# Patient Record
Sex: FEMALE | Race: WHITE | NOT HISPANIC OR LATINO | Employment: FULL TIME | URBAN - METROPOLITAN AREA
[De-identification: names, ages, dates, MRNs, and addresses within clinical notes are randomized per-mention and may not be internally consistent; named-entity substitution may affect disease eponyms.]

---

## 2021-06-28 NOTE — TELEPHONE ENCOUNTER
REFERRAL INFORMATION:    Referring Provider:  N/A    Referring Clinic:  N/A    Reason for Visit/Diagnosis: New Re-establish care, Mohit 2001       FUTURE VISIT INFORMATION:    Appointment Date: 7/7/2021    Appointment Time: 1 PM      NOTES RECORD STATUS  DETAILS   OFFICE NOTE from Referring Provider N/A    OFFICE NOTE from Other Specialists Care Everywhere 6/29/17 Office visit with Dr. Mukund Aguilar ([facility])     4/27/16 Office visit with Cheryl Parker PA-C (General Surgery)      HOSPITAL DISCHARGE SUMMARY/ ED VISITS  N/A    OPERATIVE REPORT N/A    ENDOSCOPY (EGD)  N/A    PERTINENT LABS Care Everywhere    PATHOLOGY REPORTS (RELATED) N/A    IMAGING (CT, MRI, US, XR)  N/A

## 2021-07-06 ENCOUNTER — TELEPHONE (OUTPATIENT)
Dept: ENDOCRINOLOGY | Facility: CLINIC | Age: 41
End: 2021-07-06

## 2021-07-06 NOTE — TELEPHONE ENCOUNTER
Scheduled to see CNP or KELTON at [facility], followed by an appt with a dietitian at 1:00 pm.    Left message 10:01 am    Instructed to complete pre-visit questionnaires on Snapkin, or arrive 15 minutes early to complete.    [phone] contact center phone number.    MICHELLE APPIAH CMA       Suturegard Retention Suture: 2-0 Nylon

## 2021-07-07 ENCOUNTER — VIRTUAL VISIT (OUTPATIENT)
Dept: ENDOCRINOLOGY | Facility: CLINIC | Age: 41
End: 2021-07-07
Payer: COMMERCIAL

## 2021-07-07 ENCOUNTER — PRE VISIT (OUTPATIENT)
Dept: ENDOCRINOLOGY | Facility: CLINIC | Age: 41
End: 2021-07-07

## 2021-07-07 VITALS — HEIGHT: 67 IN | BODY MASS INDEX: 35.79 KG/M2 | WEIGHT: 228 LBS

## 2021-07-07 DIAGNOSIS — Z98.84 S/P BILIOPANCREATIC DIVERSION WITH DUODENAL SWITCH: Primary | ICD-10-CM

## 2021-07-07 DIAGNOSIS — E66.01 MORBID OBESITY (H): ICD-10-CM

## 2021-07-07 PROBLEM — E61.1 IRON DEFICIENCY: Status: ACTIVE | Noted: 2017-01-12

## 2021-07-07 PROCEDURE — 99204 OFFICE O/P NEW MOD 45 MIN: CPT | Mod: 95 | Performed by: NURSE PRACTITIONER

## 2021-07-07 RX ORDER — ERGOCALCIFEROL 1.25 MG/1
50000 CAPSULE ORAL
COMMUNITY
Start: 2020-11-10 | End: 2021-07-07

## 2021-07-07 RX ORDER — CYANOCOBALAMIN 1000 UG/ML
1000 INJECTION, SOLUTION INTRAMUSCULAR; SUBCUTANEOUS
COMMUNITY
Start: 2020-11-10 | End: 2021-07-07

## 2021-07-07 RX ORDER — TOPIRAMATE 25 MG/1
TABLET, FILM COATED ORAL
Qty: 90 TABLET | Refills: 1 | Status: SHIPPED | OUTPATIENT
Start: 2021-07-07 | End: 2021-08-31

## 2021-07-07 RX ORDER — CYANOCOBALAMIN 1000 UG/ML
1 INJECTION, SOLUTION INTRAMUSCULAR; SUBCUTANEOUS
Qty: 3 ML | Refills: 3 | Status: SHIPPED | OUTPATIENT
Start: 2021-07-07

## 2021-07-07 RX ORDER — PHENTERMINE HYDROCHLORIDE 37.5 MG/1
0.5 TABLET ORAL EVERY MORNING
Qty: 15 TABLET | Refills: 1 | Status: SHIPPED | OUTPATIENT
Start: 2021-07-07 | End: 2021-08-31

## 2021-07-07 ASSESSMENT — MIFFLIN-ST. JEOR: SCORE: 1731.83

## 2021-07-07 ASSESSMENT — PAIN SCALES - GENERAL: PAINLEVEL: NO PAIN (0)

## 2021-07-07 NOTE — PATIENT INSTRUCTIONS
"Thank you for allowing us the privilege of caring for you. We hope we provided you with the excellent service you deserve.   Please let us know if there is anything else we can do for you so that we can be sure you are completely satisfied with your care experience.    To ensure the quality of our services you may be receiving a patient satisfaction survey from an independent patient satisfaction monitoring company.    The greatest compliment you can give is a \"Likely to Recommend\"    Your visit was with Lori Harris NP today.    Instructions per today's visit:     Lauri Pearson, it was great to visit with you today.  Here is a review of our visit.  If our clinic scheduler is not able to reach you please call [phone] to schedule your next appointments.    MEDICATIONS:        - Start taking topiramate (taper up to 75mg), Phentermine 1/2 tab (18.75mg)      - Start Omeprazole, twice daily for ulcer         - B12 inj sent to pharmacy        - Continue other medications without change  CONSULTATION/REFERRAL to Dietitian   FUTURE LABS:       - Schedule a fasting blood draw -bariatric labs   FUTURE APPOINTMENTS:       - Follow-up visit in 2 months       Information about Video Visits with [employer]: video visit information  _________________________________________________________________________________________________________________________________________________________  Important contact and scheduling information:  Please call our contact center at [phone] to schedule your next appointments.  To find a lab location near you, please call [phone].  For any nursing questions or concerns call Ashley Alexander LPN at [phone] or Francisca Cristina RN at [phone]  Please call during clinic hours Monday through Friday 8:00a - 4:00p if you have questions or you can contact us via [employer] at anytime and we will reply during clinic hours.    Lab results will be communicated through My Chart or " letter (if My Chart not used). Please call the clinic if you have not received communication after 1 week or if you have any questions.?  Clinic Fax: [phone]  _________________________________________________________________________________________________________________________________________________________  Meal Replacement Products:    Here is the link to our new e-store where you can purchase our meal replacement products    [facility] E-Store  [email or web address]    The one week starter kit is a great way to sample a variety of products and see what works for you.    If you want more information about the product go to: [employer] Meals  "[employer]    If you are an employee or [facility] or [facility] please contact your care team for a 10% estore discount    Free Shipping for orders over $75     Benefits of meal replacements products:    Portion and calorie control  Improved nutrition  Structured eating  Simplified food choices  Avoid contact with trigger foods  _________________________________________________________________________________________________________________________________________________________  Interested in working with a health ?  Health coaches work with you to improve your overall health and wellbeing.  They look at the whole person, and may involve discussion of different areas of life, including, but not limited to the four pillars of health (sleep, exercise, nutrition, and stress management). Discuss with your care team if you would like to start working a health .  Health Coaching-3 Pack: Schedule by calling [phone]    $99 for three health coaching visits    Visits may be done in person or via phone    Coaching is a partnership between the  and the client; Coaches do not prescribe or diagnose    Coaching helps inspire the client to reach his/her personal goals    _________________________________________________________________________________________________________________________________________________________  24 Week Healthy Lifestyle Plan:    Our mission in the 24-week Healthy Lifestyle Plan is to provide you with individualized care by giving you the tools, education and support you need to lose weight and maintain a healthy lifestyle. In your 24-week journey, you ll be supported by a dedicated weight loss team that includes registered dietitians, medical weight management providers, health coaches, and nurses -- all with special expertise in weight loss -- to help you every step of the way.     Monthly meetings with your registered dietician or medical weight management provider help to review your progress, update your care plan, and make any adjustments needed to ensure success. Between these visits, weekly and bi-weekly health  visits will help you focus on the four pillars of weight loss -- stress, sleep, nutrition, and exercise -- and how you can best adapt each to achieve sustainable weight loss results.    In addition, you will be given exclusive access to online wellbeing classes through [employer].  Your initial visit will be with a medical weight management provider who will help to understand your weight loss goals and ensure this program is the right fit for you. Please let our team know if you are interested in the 24 week plan by sending a message to your care team or calling [phone] to schedule.  _________________________________________________________________________________________________________________________________________________________    Virtual Support Groups:    We offer support groups for patients who are working on weight loss and considering, preparing for or have had weight loss surgery.     You are invited to attend the?Virtual Support Groups?provided by any of the following locations:    1. [place] minutes via [employer]  with Stella Mace RN  2.   [place] via TrustedAd Teams with Jorge Vogel, PhD, LP  3.   [place] via [employer]ed[employer] Teams with Angeles Palafox RN  4.   [facility] via Trusted[employer] Teams with Angeles Verduzco [facility] Healthy Lifestyle Virtual Support Group    Healthy Lifestyle Virtual Support Group?    This group is held monthly on a Friday from 12:30 PM - to 1:30 PM. It is 60 minutes of small group guided discussion, support and resources led by National Board Certified Health , Angeles Verduzco. All are welcome who want a healthy lifestyle. To receive monthly invites to the Healthy Lifestyle Virtual Support Group or if you have any questions about having a health  or attending support group, please email Angeles [email or web address]. Angeles will send out invites for each session, with the phone number and the conference ID number specific to that session.    2021 Meetings    January 29 - How to Stay Active and Healthy during the Winter Months    February 26 - Reading Food Labels: What do I Need to Know?  Guest Speaker: Alicia Patterson RD    March 19 - Finding Health, Happiness and Confidence at Every Size  Guest Speaker: Kisha Patel, Health Psychology Fellow    April 30 - Healthy Eating on a Budget  Guest Speaker: Sushma Aguirre RD    May 21 - Open Forum    Jun 25 - Self Compassion    July 30 - Habits: Helpful and Hindering    August 27 - Open Forum    September 24 - Sleep    October 29 - Open Forum    November - To be determined    December - To be determined    [facility] Weight Loss Surgery Support Group    [facility] Weight Loss Surgery Support Group    The [facility] Weight Loss Surgery Support Group is a patient-lead forum that meets monthly. Due to Covid-19, we are meeting remotely from 5 PM - 6 PM via Microsoft Teams on the 3rd Wednesday of the month. The group is facilitated by Stella  "Rodri, the program s Clinical Coordinator. The support group shares experiences, encouragement and education. It is open to those who have had weight loss surgery, are scheduled for surgery, and those who are considering surgery.   If you are interested in attending, please contact the clinic via [employer] or call the nurse line directly at [phone] to inform our staff that you would like an invite sent to you. Please let us know the email you would like the invite sent to. Prior to the meeting, a link with directions on how to join the meeting will be sent to you.    2021 Meetings    January 20 - Guest Speaker: Benjamin James, RD, LD     February 17 - The group will not have a speaker. This will be a time of group support.     March 17 - Guest Speaker: Bailey Lee, [facility], [facility], [facility]     April 21 - Guest Speakers. Mohinder Ramos and Maria Victoria will be talking about Mindful Eating and Mind Hunger.     May 19- No Group this month.     June 16 - \"Let's Talk\" a group discussion and time to talk and ask questions and learn from one another.    [facility] Support Groups    Connections: Bariatric Care Support Group?  This group takes place the second Tuesday of each month from 6:30 PM - 8 PM virtually using Microsoft Teams. It is led by Jorge Vogel, Ph.D who is a Licensed Psychologist with the [facility] Weight Management Program. There is no cost for group participation and it is open to all [facility] (and those external to this program) pre- and post- operative bariatric surgery patients as well as their support system.   Please send an email to Jorge Vogel, Ph.D., LP at?[email or web address]?if you would like an invitation to the group and to learn about using Microsoft Teams.    Connections: Post-Operative Bariatric Surgery Support Group  This support group meets the 4th Wednesday of the month from 11 AM - 12 PM virtually using " Microsoft Teams. It is led by Angeles Palafox RN. This is a support group for [facility] bariatric patients (and those external to [facility]) who have had bariatric surgery and are at least 3 months post-surgery. There is no cost to attend and registration is not required. Please send an email to Angeles Palafox RN at [email or web address] if you would like an invitation to the group and to learn about using Microsoft Teams.      The above Support Group information can also be found on our websites:    [email or web address]  _________________________________________________________________________________________________________________________________________________________  [facility] Get Moving Program  Our team of physical therapists is trained to help you understand and take control of your condition. They will perform a thorough evaluation to determine your ability for activity and develop a customized plan to fit your goals and physical ability.  Scheduling: Unsure if the Get Moving program is right for you? Discuss the program with your medical provider or diabetes educator. You can also call us at [phone] to ask questions or schedule an appointment.   [facility] Get Moving Program  _________________________________________________________________________________________________________________________________________________________  [facility] Diabetes Prevention Program (DPP)  If you have prediabetes and Medicare please contact us via CloudSwayt to learn more about the Diabetes Prevention Program (DPP)  Program Details:  [facility] offers the year-long Diabetes Prevention Program (DPP). The program helps you to make lifestyle changes that prevent or delay type 2 diabetes by supporting healthy eating, increased physical activity, stress reduction and use of coping skills.    On average, previous [facility] DPP cohorts have lost and maintained at least 5% of their starting weight throughout the program and averaged more than 150 minutes of physical activity per week.  Participants meet weekly for one-hour group sessions over sixteen weeks, every other week for the next 8 weeks, and monthly for the last six months.   A year-long maintenance program is also available for participants who complete the first year.   Location & Cost:   During the COVID-19 Public Health Emergency, the program is offered virtually. When in-person classes can resume, they will be held at [facility].  For people with Medicare, the program is covered in full. A self-pay option will also be available for those with non-Medicare insurance plans.   _________________________________________________________________________________________________________________________________________________________  Bluetooth Scale:    We hope to provide you with high quality virtual healthcare visits while social distancing for COVID-19 is necessary, as well as in the future when virtual visits may be more convenient for you.     Our technology team made it possible for Bluetooth scales to send weight measurements to our electronic medical record. This allows weights from you weighing at home to securely flow into the medical record, which will improve telephone and virtual visits.   Additionally, studies have shown that adults actually lose more weight when their weights are automatically sent to someone else, and also that this process is not stressful for those adults.    Below is a link for purchasing the scale, with a discount code for our patients. You may call your insurance company to see if they will reimburse you for the cost of the scale, as a piece of durable medical equipment. The scales only go up to a weight of 400 pounds. This is an issue and we are working with the  developer on increasing this. We found no scales that go over 400lb that have blue-tooth for connecting to appAttach.    Scale to purchase: the NTQ-Data  Body  Scale: [email or web address]=aw.ds    Discount Code: We have a discount code for our patients to bring the cost down to $50, Discount code is: UMinchelita_Scale_20%off      Thank you,   [facility] Weight Management Team                             MEDICATION STARTED AT THIS APPOINTMENT    We are starting Phentermine. Take one tablet in the morning. Contact the nurse via appAttach or call [phone] if you have any questions or concerns. (Do not stop taking it if you don't think it's working. For some people it works without them knowing it.)    Phentermine is being prescribed because you identified hunger as one of the main causes for your extra weight.      Our patients on Phentermine find that they:    >feel less hunger    >find it easier to push the plate away   >have an easier time eating less    For some of our patients, these feelings are very real and immediate. For other patients, the feelings are less obvious. They don't feel much of a change but find they've lost weight. Like all weight loss medications, Phentermine  works best when you help it work. This means:  1. Having less tempting high calorie (fattening) food around the house or office. (For people with strong cravings this is very important.)   2. Staying away from situations or people that may trigger your cravings .   3. Eating out only one time or less each week.  4. Eating your meals at a table with the TV or computer off.    Side-effects. Phentermine is generally well tolerated. The main side-effects we see are feelings of racing pulse or rapid heart beat. Some people can get an elevated blood pressure. Because of this we may have you come back within a week or so of starting the  medication for a blood pressure check.         In order to get refills of this or any medication we prescribe you must be seen in the medical weight mgmt clinic every 2-3 months.      MEDICATION STARTED AT THIS APPOINTMENT  We are starting topiramate at bedtime.  Start one tab, 25 mg, for a week. Go up to 50 mg (2 tabs) for the next week. At the third week, take   3 tabs (75 mg).  Stay at 3 tabs until you are seen again. Contact the nurse via Share Some Style or call [phone] if you have any questions or concerns. (Do not stop taking it if you don't think it's working. For some people it works even though they do not feel much different.)    Topiramate (Topamax) is a medication that is used most often to treat migraine headaches or for seizures. It has also been found to help with weight loss. Although it's not currently FDA approved for weight loss, it has been used safely for a number of years to help people who are carrying extra weight.     Just how topiramate helps with weight loss has not been exactly determined. However it seems to work on areas of the brain to quiet down signals related to eating.      Topiramate may make you:    >feel less interest in eating in between meals   >think less about food and eating   >find it easier to push the plate away   >find giving up pop easier    >have an easier time eating less    For some of our patients, the pills work right away. They feel and think quite differently about food. Other patients don't feel much of a change but find in fact they have lost weight! Like all weight loss medications, topiramate works best when you help it work.  This means:    1) Have less tempting high calorie (fattening) food around the house or office    2) Have lower calorie food (fruits, vegetables,low fat meats and dairy) for snacks    3) Eat out only one time or less each week.   4) Eat your meals at a table with the TV or computer off.    Side-effects. Topiramate is generally well  tolerated. The main side-effects we see are:   Tingling in hands,feet, or face (usually not very troublesome)   Mental confusion and word finding trouble (about 10% of patients have this.)     Feeling sleepy or a bit dopey- this goes away very soon after starting.    One of the dangers of topiramate is the possibility of birth defects--if you get pregnant when you are on it, there is the risk that your baby will be born with a cleft lip or palate.  If you are on topiramate and of child bearing age, you need to be on a reliable form of birth control or refrain from sexual intercourse.     Please refer to the pharmacy insert for more information on side-effects. Since many pharmacists are not familiar with the use of topiramate in weight loss, calling the clinic will get you the most accurate information on the use of this medication for weight loss.     In order to get refills of this or any medication we prescribe you must be seen in the medical weight mgmt clinic every 2-3 months.

## 2021-07-07 NOTE — PROGRESS NOTES
Saima is a 41 year old who is being evaluated via a billable video visit.      How would you like to obtain your AVS? Instabeathart  If the video visit is dropped, the invitation should be resent by: Text to cell phone: [phone]  Will anyone else be joining your video visit? No      Video Start Time: 1258  Video-Visit Details    Type of service:  Video Visit    Video End Time:1319    Originating Location (pt. Location): Home    Distant Location (provider location):  [facility] WEIGHT MANAGEMENT CLINIC [place]     Platform used for Video Visit: WangYou

## 2021-07-07 NOTE — NURSING NOTE
"Chief Complaint   Patient presents with     Consult     Consultation Bariatric Surgery Re-Establish Care       Vitals:    07/07/21 1223   Weight: 103.4 kg (228 lb)   Height: 1.702 m (5' 7\")       Body mass index is 35.71 kg/m .                         "

## 2021-07-07 NOTE — ASSESSMENT & PLAN NOTE
18 years s/p DS with difficulties with weight regain and epigastric pain. Lap sandra 2018. Pain is worse in the morning, will increase in severity that she doubles over in pain. Pain resolves with oral intake. No recent EGD. No PPI. No tobacco use since quitting in 2018. No NSAIDS. Rare alcohol. No signs of bleeding. Recommend omeprazole twice daily on empty stomach and consider EGD in the future.     No recent labs. Feels sluggish. paraesthesias previously but not recently. Noted easy bruising in Care Everywhere 11/2020. Does not take any supplements currently. DS labs ordered. Patient requests b12 script, sent to pharmacy.

## 2021-07-07 NOTE — LETTER
"2021       RE: Saima Pearson  [id number]  [place] [address]     Dear Colleague,    Thank you for referring your patient, Saima Pearson, to the [facility] [place] at [facility]. Please see a copy of my visit note below.    New Re-establish Bariatric Surgery Consultation Note    2021    RE: Saima Pearson  MR#: [id number]  : 1980      Referring provider: No flowsheet data found.    Chief Complaint/Reason for visit: re-establish bariatric care    Dear Marilyn Monte (General),    I had the pleasure of seeing your patient, Saima Pearson, to re-establish bariatric care. As you know, Saima Pearson is 41 year old.  She has a height of 5' 7\", a weight of 228 lbs 0 oz, and calculated Body mass index is 35.71 kg/m .. She has a history of DS in   at [facility] with Dr. Rueda.     Assessment & Plan   Problem List Items Addressed This Visit        Digestive    Morbid obesity (H)     Continued struggles with weight s/p DS. Has been working on going back to post bariatric surgery diet but struggles with cravings and thoughts about food and urges. Difficult to stop eating when full if food tastes good. She is wondering if she could consider a revision to get restiction back. However, it sounds like adequate restriction still exists. Discussed that revision for weight loss is not offered at our clinic- risk very high and limited results from the procedure. mwm would be recommended.     For cravings/ thoughts about food, discussed topiramate and phentermine. [employer]care would not cover Wegovy. No hx of DMII so other GLP1 not likely covered. Qsymia not likely covered. No hx kidney stones or glaucoma. No hx of HTN or anxiety. Will start phentermine and topiramate and follow up in 2 months. Stressed hydration.     Stressed importance of RD as well.          Relevant Medications    topiramate " "(TOPAMAX) 25 MG tablet    phentermine (ADIPEX-P) 37.5 MG tablet       Other    S/P biliopancreatic diversion with duodenal switch - Primary     18 years s/p DS with difficulties with weight regain and epigastric pain. Lap sandra 2018. Pain is worse in the morning, will increase in severity that she doubles over in pain. Pain resolves with oral intake. No recent EGD. No PPI. No tobacco use since quitting in 2018. No NSAIDS. Rare alcohol. No signs of bleeding. Recommend omeprazole twice daily on empty stomach and consider EGD in the future.     No recent labs. Feels sluggish. paraesthesias previously but not recently. Noted easy bruising in Care Everywhere 11/2020. Does not take any supplements currently. DS labs ordered. Patient requests b12 script, sent to pharmacy.              Relevant Medications    omeprazole (PRILOSEC) 20 MG DR capsule    Syringe/Needle, Disp, (BD ECLIPSE SYRINGE) 25G X 5/8\" 3 ML MISC    cyanocobalamin (CYANOCOBALAMIN) 1000 MCG/ML injection    Other Relevant Orders    CBC with platelets    Comprehensive metabolic panel    Ferritin    Hemoglobin A1c    Lipid panel reflex to direct LDL Fasting    Parathyroid Hormone Intact    Vitamin A    Vitamin B12    Vitamin D Deficiency    Vitamin K    Vitamin E         Care everywhere [facility], primary care, labs, images   Unable to access all records from [facility]   Review of external notes as documented above     45 minutes spent on the date of the encounter doing chart review, history and exam, documentation and further activities per the note    Saima Pearson is a 41 year old female who presents to clinic today for the following health issues       HISTORY OF PRESENT ILLNESS:  Weight Loss History Reviewed with Patient 7/7/2021   How long have you been overweight? Since early childhood   What is the most that you have ever weighed? 315   What is the most weight you have lost? 90   I have tried the following methods to lose weight Watching " portions or calories, Exercise, Weight Watchers, Weight Loss Surgery, Physician directed program   I have tried the following weight loss medications? (Check all that apply) Phentermine/Adipex-p/Suprenza   Have you ever had weight loss surgery? Yes   Please select the type of weight loss surgery you had (select all that apply): duodenal switch   Previous NRB 2016 with Cheryl Parker PA-C - weight regain and RUQ pain     Struggling with weight loss- working on stopping when full. Continues to have restriction- sometimes difficult to stop because it tastes good    Hunger between meals while decreasing portions at meals.     Continued epigastric pain- worse right away in the morning. Will bend over in pain. Improves with eating. This is intermittent. Patient suspects ulcer- no recent EGD, not taking anything for this.     Overall, feels sluggish and always tired. No taking any supplements. Ran out of b12. No recent labs.     Bright red blood per rectum 11/2020- has resolved. No black tary stools. No vomiting.     No NSAIDS, no tobacco     CO-MORBIDITIES OF OBESITY INCLUDE:     7/7/2021   I have the following health issues associated with obesity: Pre-Diabetes, Sleep Apnea, GERD (Reflux)   I have the following symptoms associated with obesity: -     Family Medicine 11/2020- periumbilical pain, rectal bleeding, easy bruising   1/2019- parasthesias?   No EGD?     PAST MEDICAL HISTORY:  No past medical history on file.  No hx of kidney stones     PAST SURGICAL HISTORY:  No past surgical history on file.  S/p sandra    FAMILY HISTORY:   No family history on file.    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 -   Which best describes your marital status:    Do you have children? Yes   Who do you have in your support network that can be  available to help you for the first 2 weeks after surgery? My boyfriend and mother   Who can you count on for support throughout your weight loss surgery journey? boyfriend   Can you afford 3 meals a day?  Yes   Can you afford 50-60 dollars a month for vitamins? Yes       HABITS:     7/7/2021   How often do you drink alcohol? Monthly or less   If you do drink alcohol, how many drinks might you have in a day? (one drink = 5 oz. wine, 1 can/bottle of beer, 1 shot liquor) 3 or 4   Have you ever used any of the following nicotine products? Cigarettes   If you previously used any of these products, what year did you quit? 2018   Have you or are you currently using street drugs or prescription strength medication for which you do not have a prescription for? No   Do you have a history of chemical dependency (alcohol or drug abuse)? No     Tobacco use- 2018    PSYCHOLOGICAL HISTORY:   Psychological History Reviewed With Patient 7/7/2021   Have you ever attempted suicide? Never.   Have you had thoughts of suicide in the past year? No   Have you ever been hospitalized for mental illness or a suicide attempt? Never.   Do you have a history of chronic pain? No   Have you ever been diagnosed with fibromyalgia? No   Are you currently being treated for any of the following? (select all that apply) -   Are you currently seeing a therapist or counselor?  No   Are you currently seeing a psychiatrist? No       ROS:     7/7/2021   Skin:  Varicose veins   HEENT: None of these   Musculoskeletal: Joint Pain, Back pain   Cardiovascular: None of the above   Pulmonary: Shortness of breath with activity   Gastrointestinal: Heartburn, Reflux, Ulcers   Genitourinary: None of the above   Hematological: None of the above   Neurological: None of the above   Female only: None of the above       EATING BEHAVIORS:     7/7/2021   Have you or anyone else thought that you had an eating disorder? No   Do you currently binge eat (eat a large amount of  "food in a short time)? No   Are you an emotional eater? No   Do you get up to eat after falling asleep? No     Meets protein goal   Significant cravings, difficult to say no to things       EXERCISE:     7/7/2021   How often do you exercise? 3 to 4 times per week   What is the duration of your exercise (in minutes)? 20 Minutes   What types of exercise do you do? walking, other   What keeps you from being more active?  I am as active as I can possbily be, Too tired       MEDICATIONS:  Current Outpatient Medications   Medication Sig Dispense Refill     cyanocobalamin (CYANOCOBALAMIN) 1000 MCG/ML injection Inject 1 mL (1,000 mcg) Subcutaneous every 30 days 3 mL 3     omeprazole (PRILOSEC) 20 MG DR capsule Take 1 capsule (20 mg) by mouth 2 times daily 60 capsule 3     phentermine (ADIPEX-P) 37.5 MG tablet Take 0.5 tablets (18.75 mg) by mouth every morning 15 tablet 1     Syringe/Needle, Disp, (BD ECLIPSE SYRINGE) 25G X 5/8\" 3 ML MISC 1 Syringe every 30 days 3 each 3     topiramate (TOPAMAX) 25 MG tablet 25mg at bedtime for week 1, 50mg at bedtime for 1 week, and 75mg at bedtime thereafter 90 tablet 1       ALLERGIES:  No Known Allergies  Bariatric labs 11/2020 [facility] Care Everywhere- Vitamin D and CBC, Ferritin and CMP WNL ; B12 low - other labs not done.     2017- normal UGI - care everywhere   2017- abd ultrasound- multiple gallstones       Historic Results on 07/25/2007   Component Date Value Ref Range Status     Transferrin-Binding Capacity 07/25/2007 487  315 - 540 ug/dL Final     Transferrin 07/25/2007 327  210 - 360 mg/dL Final     Iron 07/25/2007 33* 35 - 180 ug/dL Final     Platelet Count 07/25/2007 324  150 - 450 10e9/L Final     Zinc 07/25/2007 55  55 - 150 ug/dL Final     Vitamin K 07/25/2007 < 0.03   Final    Comment: Reference range: 0.10 to 2.20                           Unit: ng/mL                           (Note)                           Vitamin K concentrations in healthy individuals              "              typically are greater than 0.10 ng/mL.  Low plasma                           vitamin K concentrations reflect low hepatic stores.                           Repeated specimen freezing and thawing, and exposure                           to ultraviolet light may result in decreased values.                           Performed by [facility],                           [address] [place] [address] [phone]                           [email or web address],  Peter Contreras MD - Lab. Director     25 OH Vit D2 07/25/2007 <5  ug/L Final     25 OH Vit D3 07/25/2007 37  ug/L Final     25 OH Vit D total 07/25/2007 <42  30 - 75 ug/L Final    Comment: Season, race, dietary intake, and treatment affect the concentration of                            25-hydroxy-Vitamin D. Values may decrease during winter months and increase                            during summer months. Values less than 30 ug/L may indicate Vitamin D                            deficiency.     Vitamin B6 07/25/2007 35.7*  Final    Comment: Reference range: 5.0 to 30.0                           Unit: ng/mL                           (Note)                           Performed by [facility],                           [address] [facility] [place] [address] [phone]                           [email or web address],  Peter Contreras MD - Lab. Director     Vitamin B2 07/25/2007 SEE NOTE   Final    Comment: (Note)                           TEST DESCRIPTION         RESULT       REFERENCE RANGE                           ----------------         ------       ---------------                           VIT. B2, RIBOFLAVIN*     5.4          3 - 15 ng/mL                           *This test was evaluated and its performance                           characteristics determined by [facility].                           It has not been cleared or approved by the U.S. Food and                           Drug Administration.  The FDA has determined that  such a                           clearance or approval is not necessary.  The results                           reported for this procedure are for research use only.                           [facility] is certified under the Clinical                           Laboratory Improvement Act of 1988 (CLIA) as qualified                           to perform high-complexity clinical testing.                           TEST PERFORMED BY:                               [facility]                               [address] [place]  [address]                           Performed by [facility],                           [address] [place] [address] [phone]                           [email or web address],  Danna Contreras MD - Lab. Director     Vitamin B1 07/25/2007 1.1   Final    Comment: Reference range: 0.2 to 2.0                           Unit: ug/dL                           (Note)                           Performed by [facility],                           [address] [place] [address] [phone]                           [email or web address],  Danna Contreras MD - Lab. Director     Vitamin E 07/25/2007 5.4*  Final    Comment: Reference range: 5.5 to 18.0                           Unit: mg/L     Vitamin E Gamma 07/25/2007 0.7   Final    Comment: Reference range: 0.0 to 6.0                           Unit: mg/L                           (Note)                           Performed by [facility],                           [address] [place] [address] [phone]                           [email or web address],  Danna Contreras MD - Lab. Director     Vitamin A 07/25/2007 0.26*  Final    Comment: Reference range: 0.30 to 1.20                           Unit: mg/L     Retinol Palmitate 07/25/2007 0.00   Final    Comment: Reference range: 0.00 to 0.10                           Unit: mg/L     Vitamin A Interp 07/25/2007 SEE NOTE   Final     Comment: (Note)                           Retinol greater than 0.20 mg/L is typically associated                           with adequate liver stores in adults, and is within                           normal limits for children. Retinol less than 0.10 mg/L                           may indicate depleted liver stores and severe deficiency.                           Specimens which come in contact with plastic tubing or                           are exposed to excessive light may show low results.                           Performed by [facility],                           [facility] [place] [address] [phone]                           [email or web address],  Peter Contreras MD - Lab. Director     Vitamin B12 07/25/2007 224  >210 pg/mL Final    Interp: 211-246 = Indeterminate     TSH 07/25/2007 2.17  0.4 - 5.0 mU/L Final     Parathyroid Hormone Intact 07/25/2007 48  12 - 72 pg/mL Final     Phosphorus 07/25/2007 4.7* 2.5 - 4.5 mg/dL Final     Magnesium 07/25/2007 1.7  1.6 - 2.3 mg/dL Final     MCV 07/25/2007 84  78 - 100 fl Final     MCH 07/25/2007 27.4  26.5 - 33.0 pg Final     MCHC 07/25/2007 32.8  31.5 - 36.5 g/dL Final     RDW 07/25/2007 15.9* 10.0 - 15.0 % Final     WBC 07/25/2007 9.3  4.0 - 11.0 10e9/L Final     RBC Count 07/25/2007 4.27  3.8 - 5.2 10e12/L Final     Hemoglobin 07/25/2007 11.7  11.7 - 15.7 g/dL Final     Hematocrit 07/25/2007 35.7  35.0 - 47.0 % Final     Homocysteine umol/L 07/25/2007 9.6  4.0 - 12.0 umol/L Final    Comment: A normal plasma homocysteine level rules out homocystinuria homozygote and                            defects involving the remethylation of homocysteine.  The heterozygous state                            for cystathionine betasynthase (CBS) is best tested by methionine loading.                            Lv Oconnor, Ph.D. [phone]     Folate 07/25/2007 11.4  >3.3 ng/mL Final    Interp:  >5.4 ng/mL = Normal     Ferritin 07/25/2007 5* 10 - 120 ng/mL Final      Copper 07/25/2007 83   Final    Comment: Reference range: 80 to 155                           Unit: ug/dL                           (Note)                           TEST INFORMATION: Copper, Serum                           Serum copper may be elevated with infection, inflammation,                           stress, copper supplementation, oral contraceptives, and                           pregnancy. Concentrations are 2-3 times normalin the                           third trimester of pregnancy. Copper may be lowered with                           corticosteroids, zinc, malnutrition and malabsorption.                           Performed by [facility],                           [address] [place] [address] [phone]                           [email or web address],  Peter Contreras MD - Lab. Director     Sodium 07/25/2007 142  133 - 144 mmol/L Final     Potassium 07/25/2007 4.4  3.4 - 5.3 mmol/L Final     Chloride 07/25/2007 105  94 - 109 mmol/L Final     Carbon Dioxide 07/25/2007 24  20 - 32 mmol/L Final     Glucose 07/25/2007 76  60 - 99 mg/dL Final     Urea Nitrogen 07/25/2007 13  5 - 24 mg/dL Final     Creatinine 07/25/2007 0.71  0.60 - 1.30 mg/dL Final     GFR Estimate 07/25/2007 >90  >60 mL/min/1.7m2 Final     GFR Estimate If Black 07/25/2007 >90  >60 mL/min/1.7m2 Final     Calcium 07/25/2007 8.7  8.5 - 10.4 mg/dL Final     AST 07/25/2007 25  0 - 45 U/L Final     Protein Total 07/25/2007 7.5  6.0 - 8.2 g/dL Final     Anion Gap 07/25/2007 13  6 - 17 mmol/L Final     Albumin 07/25/2007 4.5  3.3 - 4.6 g/dL Final     ALT 07/25/2007 17  0 - 50 U/L Final     Alkaline Phosphatase 07/25/2007 59  40 - 150 U/L Final     Bilirubin Total 07/25/2007 0.3  0.2 - 1.3 mg/dL Final     Carnitine Free 07/25/2007 45   Final    Comment: Reference range: 25 to 60                           Unit: umol/L     Carnitine Total 07/25/2007 55   Final    Comment: Reference range: 34 to 86                           Unit: umol/L      "Carnitine Esterified 07/25/2007 10   Final    Comment: Reference range: 5 to 29                           Unit: umol/L     Cholesterol 07/25/2007 122  0 - 200 mg/dL Final    Comment: LDL Cholesterol is the primary guide to therapy: LDL-cholesterol goal in high                            risk patients is <100 mg/dL and in very high risk patients is <70 mg/dL.                            The NCEP recommends further evaluation of: patients with cholesterol <200                            mg/dL                            if additionalrisk factors are present, cholesterol >240 mg/dL, triglycerides                            >150 mg/dL, or HDL <40 mg/dL.     Triglycerides 07/25/2007 98  0 - 150 mg/dL Final     HDL Cholesterol 07/25/2007 48* 50 - 110 mg/dL Final     LDL Cholesterol Calculated 07/25/2007 55  0 - 129 mg/dL Final     VLDL-Cholesterol 07/25/2007 20  0 - 30 mg/dL Final     Cholesterol/HDL Ratio 07/25/2007 2.6  0.0 - 5.0 Final     Iron Saturation 07/25/2007 7* 20 - 55 % Final       PHYSICAL EXAM:  Objective    Ht 1.702 m (5' 7\")   Wt 103.4 kg (228 lb)   BMI 35.71 kg/m    Vitals - Patient Reported  Pain Score: No Pain (0)      Vitals:  No vitals were obtained today due to virtual visit.    Physical Exam   GENERAL: Healthy, alert and no distress  EYES: Eyes grossly normal to inspection.  No discharge or erythema, or obvious scleral/conjunctival abnormalities.  RESP: No audible wheeze, cough, or visible cyanosis.  No visible retractions or increased work of breathing.    SKIN: Visible skin clear. No significant rash, abnormal pigmentation or lesions.  NEURO: Cranial nerves grossly intact.  Mentation and speech appropriate for age.  PSYCH: Mentation appears normal, affect normal/bright, judgement and insight intact, normal speech and appearance well-groomed.      In summary, Saima Pearson has Class II obesity with a body mass index of Body mass index is 35.71 kg/m . kg/m2 and the comorbidities stated above. " She has a history of D/S in 2003 and is here to re-establish bariatric care and discuss weight regain and epigastric pain.    MEDICATIONS:        - Start taking topiramate (taper up to 75mg), Phentermine 1/2 tab (18.75mg)      - Start Omeprazole, twice daily for ulcer         - B12 inj sent to pharmacy        - Continue other medications without change  CONSULTATION/REFERRAL to Dietitian   FUTURE LABS:       - Schedule a fasting blood draw -bariatric labs   FUTURE APPOINTMENTS:       - Follow-up visit in 2 months         Sincerely,     Lori Harris NP       [facility]     Saima is a 41 year old who is being evaluated via a billable video visit.      How would you like to obtain your AVS? MyChart  If the video visit is dropped, the invitation should be resent by: Text to cell phone: [phone]  Will anyone else be joining your video visit? No      Video Start Time: 1258  Video-Visit Details    Type of service:  Video Visit    Video End Time:1319    Originating Location (pt. Location): Home    Distant Location (provider location):  [facility]NAG[facility] [employer]     Platform used for Video Visit: [employer]

## 2021-07-07 NOTE — ASSESSMENT & PLAN NOTE
Continued struggles with weight s/p DS. Has been working on going back to post bariatric surgery diet but struggles with cravings and thoughts about food and urges. Difficult to stop eating when full if food tastes good. She is wondering if she could consider a revision to get restiction back. However, it sounds like adequate restriction still exists. Discussed that revision for weight loss is not offered at our clinic- risk very high and limited results from the procedure. mwm would be recommended.     For cravings/ thoughts about food, discussed topiramate and phentermine. [employer] would not cover Wegovy. No hx of DMII so other GLP1 not likely covered. Qsymia not likely covered. No hx kidney stones or glaucoma. No hx of HTN or anxiety. Will start phentermine and topiramate and follow up in 2 months. Stressed hydration.     Stressed importance of RD as well.

## 2021-07-07 NOTE — PROGRESS NOTES
"New Re-establish Bariatric Surgery Consultation Note    2021    RE: Saima Pearson  MR#: [id number]  : 1980      Referring provider: No flowsheet data found.    Chief Complaint/Reason for visit: re-establish bariatric care    Dear Marilyn Monte (General),    I had the pleasure of seeing your patient, Saima Pearson, to re-establish bariatric care. As you know, Saima Pearson is 41 year old.  She has a height of 5' 7\", a weight of 228 lbs 0 oz, and calculated Body mass index is 35.71 kg/m .. She has a history of DS in   at [facility] with Dr. Rueda.     Assessment & Plan   Problem List Items Addressed This Visit        Digestive    Morbid obesity (H)     Continued struggles with weight s/p DS. Has been working on going back to post bariatric surgery diet but struggles with cravings and thoughts about food and urges. Difficult to stop eating when full if food tastes good. She is wondering if she could consider a revision to get restiction back. However, it sounds like adequate restriction still exists. Discussed that revision for weight loss is not offered at our clinic- risk very high and limited results from the procedure. mwm would be recommended.     For cravings/ thoughts about food, discussed topiramate and phentermine. [employer] would not cover Wegovy. No hx of DMII so other GLP1 not likely covered. Qsymia not likely covered. No hx kidney stones or glaucoma. No hx of HTN or anxiety. Will start phentermine and topiramate and follow up in 2 months. Stressed hydration.     Stressed importance of RD as well.          Relevant Medications    topiramate (TOPAMAX) 25 MG tablet    phentermine (ADIPEX-P) 37.5 MG tablet       Other    S/P biliopancreatic diversion with duodenal switch - Primary     18 years s/p DS with difficulties with weight regain and epigastric pain. Lap sandra 2018. Pain is worse in the morning, will increase in severity that she doubles over in pain. " "Pain resolves with oral intake. No recent EGD. No PPI. No tobacco use since quitting in 2018. No NSAIDS. Rare alcohol. No signs of bleeding. Recommend omeprazole twice daily on empty stomach and consider EGD in the future.     No recent labs. Feels sluggish. paraesthesias previously but not recently. Noted easy bruising in Care Everywhere 11/2020. Does not take any supplements currently. DS labs ordered. Patient requests b12 script, sent to pharmacy.              Relevant Medications    omeprazole (PRILOSEC) 20 MG DR capsule    Syringe/Needle, Disp, (BD ECLIPSE SYRINGE) 25G X 5/8\" 3 ML MISC    cyanocobalamin (CYANOCOBALAMIN) 1000 MCG/ML injection    Other Relevant Orders    CBC with platelets    Comprehensive metabolic panel    Ferritin    Hemoglobin A1c    Lipid panel reflex to direct LDL Fasting    Parathyroid Hormone Intact    Vitamin A    Vitamin B12    Vitamin D Deficiency    Vitamin K    Vitamin E         Care everywhere [facility], primary care, labs, images   Unable to access all records from [facility]   Review of external notes as documented above     45 minutes spent on the date of the encounter doing chart review, history and exam, documentation and further activities per the note    Saima Pearson is a 41 year old female who presents to clinic today for the following health issues       HISTORY OF PRESENT ILLNESS:  Weight Loss History Reviewed with Patient 7/7/2021   How long have you been overweight? Since early childhood   What is the most that you have ever weighed? 315   What is the most weight you have lost? 90   I have tried the following methods to lose weight Watching portions or calories, Exercise, Weight Watchers, Weight Loss Surgery, Physician directed program   I have tried the following weight loss medications? (Check all that apply) Phentermine/Adipex-p/Suprenza   Have you ever had weight loss surgery? Yes   Please select the type of weight loss surgery you had (select all that " apply): duodenal switch   Previous NRB 2016 with Chreyl Parker PA-C - weight regain and RUQ pain     Struggling with weight loss- working on stopping when full. Continues to have restriction- sometimes difficult to stop because it tastes good    Hunger between meals while decreasing portions at meals.     Continued epigastric pain- worse right away in the morning. Will bend over in pain. Improves with eating. This is intermittent. Patient suspects ulcer- no recent EGD, not taking anything for this.     Overall, feels sluggish and always tired. No taking any supplements. Ran out of b12. No recent labs.     Bright red blood per rectum 11/2020- has resolved. No black tary stools. No vomiting.     No NSAIDS, no tobacco     CO-MORBIDITIES OF OBESITY INCLUDE:     7/7/2021   I have the following health issues associated with obesity: Pre-Diabetes, Sleep Apnea, GERD (Reflux)   I have the following symptoms associated with obesity: -     Family Medicine 11/2020- periumbilical pain, rectal bleeding, easy bruising   1/2019- parasthesias?   No EGD?     PAST MEDICAL HISTORY:  No past medical history on file.  No hx of kidney stones     PAST SURGICAL HISTORY:  No past surgical history on file.  S/p sandra    FAMILY HISTORY:   No family history on file.    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 -   Which best describes your marital status:    Do you have children? Yes   Who do you have in your support network that can be available to help you for the first 2 weeks after surgery? My boyfriend and mother   Who can you count on for support throughout your weight loss surgery journey? boyfriend   Can you afford 3 meals a day?  Yes   Can you afford 50-60 dollars a month for vitamins? Yes       HABITS:     7/7/2021   How often do you drink alcohol?  Monthly or less   If you do drink alcohol, how many drinks might you have in a day? (one drink = 5 oz. wine, 1 can/bottle of beer, 1 shot liquor) 3 or 4   Have you ever used any of the following nicotine products? Cigarettes   If you previously used any of these products, what year did you quit? 2018   Have you or are you currently using street drugs or prescription strength medication for which you do not have a prescription for? No   Do you have a history of chemical dependency (alcohol or drug abuse)? No     Tobacco use- 2018    PSYCHOLOGICAL HISTORY:   Psychological History Reviewed With Patient 7/7/2021   Have you ever attempted suicide? Never.   Have you had thoughts of suicide in the past year? No   Have you ever been hospitalized for mental illness or a suicide attempt? Never.   Do you have a history of chronic pain? No   Have you ever been diagnosed with fibromyalgia? No   Are you currently being treated for any of the following? (select all that apply) -   Are you currently seeing a therapist or counselor?  No   Are you currently seeing a psychiatrist? No       ROS:     7/7/2021   Skin:  Varicose veins   HEENT: None of these   Musculoskeletal: Joint Pain, Back pain   Cardiovascular: None of the above   Pulmonary: Shortness of breath with activity   Gastrointestinal: Heartburn, Reflux, Ulcers   Genitourinary: None of the above   Hematological: None of the above   Neurological: None of the above   Female only: None of the above       EATING BEHAVIORS:     7/7/2021   Have you or anyone else thought that you had an eating disorder? No   Do you currently binge eat (eat a large amount of food in a short time)? No   Are you an emotional eater? No   Do you get up to eat after falling asleep? No     Meets protein goal   Significant cravings, difficult to say no to things       EXERCISE:     7/7/2021   How often do you exercise? 3 to 4 times per week   What is the duration of your exercise (in minutes)? 20 Minutes  "  What types of exercise do you do? walking, other   What keeps you from being more active?  I am as active as I can possbily be, Too tired       MEDICATIONS:  Current Outpatient Medications   Medication Sig Dispense Refill     cyanocobalamin (CYANOCOBALAMIN) 1000 MCG/ML injection Inject 1 mL (1,000 mcg) Subcutaneous every 30 days 3 mL 3     omeprazole (PRILOSEC) 20 MG DR capsule Take 1 capsule (20 mg) by mouth 2 times daily 60 capsule 3     phentermine (ADIPEX-P) 37.5 MG tablet Take 0.5 tablets (18.75 mg) by mouth every morning 15 tablet 1     Syringe/Needle, Disp, (BD ECLIPSE SYRINGE) 25G X 5/8\" 3 ML MISC 1 Syringe every 30 days 3 each 3     topiramate (TOPAMAX) 25 MG tablet 25mg at bedtime for week 1, 50mg at bedtime for 1 week, and 75mg at bedtime thereafter 90 tablet 1       ALLERGIES:  No Known Allergies  Bariatric labs 11/2020 [facility] Care Everywhere- Vitamin D and CBC, Ferritin and CMP WNL ; B12 low - other labs not done.     2017- normal UGI - care everywhere   2017- abd ultrasound- multiple gallstones       Historic Results on 07/25/2007   Component Date Value Ref Range Status     Transferrin-Binding Capacity 07/25/2007 487  315 - 540 ug/dL Final     Transferrin 07/25/2007 327  210 - 360 mg/dL Final     Iron 07/25/2007 33* 35 - 180 ug/dL Final     Platelet Count 07/25/2007 324  150 - 450 10e9/L Final     Zinc 07/25/2007 55  55 - 150 ug/dL Final     Vitamin K 07/25/2007 < 0.03   Final    Comment: Reference range: 0.10 to 2.20                           Unit: ng/mL                           (Note)                           Vitamin K concentrations in healthy individuals                           typically are greater than 0.10 ng/mL.  Low plasma                           vitamin K concentrations reflect low hepatic stores.                           Repeated specimen freezing and thawing, and exposure                           to ultraviolet light may result in decreased values.                           " Performed by [facility],                           [address] [place] [address] [phone]                           [email or web address],  Peter Contreras MD - Lab. Director     25 OH Vit D2 07/25/2007 <5  ug/L Final     25 OH Vit D3 07/25/2007 37  ug/L Final     25 OH Vit D total 07/25/2007 <42  30 - 75 ug/L Final    Comment: Season, race, dietary intake, and treatment affect the concentration of                            25-hydroxy-Vitamin D. Values may decrease during winter months and increase                            during summer months. Values less than 30 ug/L may indicate Vitamin D                            deficiency.     Vitamin B6 07/25/2007 35.7*  Final    Comment: Reference range: 5.0 to 30.0                           Unit: ng/mL                           (Note)                           Performed by [facility],                           [address] [place] [address] [phone]                           [email or web address],  Peter Contreras MD - Lab. Director     Vitamin B2 07/25/2007 SEE NOTE   Final    Comment: (Note)                           TEST DESCRIPTION         RESULT       REFERENCE RANGE                           ----------------         ------       ---------------                           VIT. B2, RIBOFLAVIN*     5.4          3 - 15 ng/mL                           *This test was evaluated and its performance                           characteristics determined by [facility].                           It has not been cleared or approved by the U.S. Food and                           Drug Administration.  The FDA has determined that such a                           clearance or approval is not necessary.  The results                           reported for this procedure are for research use only.                           [facility] is certified under the Clinical                           Laboratory Improvement Act of 1988 (CLIA) as  qualified                           to perform high-complexity clinical testing.                           TEST PERFORMED BY:                               [facility]                               [address]                               [place]  [address]                           Performed by [facility],                           [address] [place] [address] [phone]                           [email or web address],  Danna Contreras MD - Lab. Director     Vitamin B1 07/25/2007 1.1   Final    Comment: Reference range: 0.2 to 2.0                           Unit: ug/dL                           (Note)                           Performed by [facility],                           [address] [place] [address] [phone]                           [email or web address],  Danna Contreras MD - Lab. Director     Vitamin E 07/25/2007 5.4*  Final    Comment: Reference range: 5.5 to 18.0                           Unit: mg/L     Vitamin E Gamma 07/25/2007 0.7   Final    Comment: Reference range: 0.0 to 6.0                           Unit: mg/L                           (Note)                           Performed by [facility],                           [address] [place] [address] [phone]                           [email or web address],  Danna Contreras MD - Lab. Director     Vitamin A 07/25/2007 0.26*  Final    Comment: Reference range: 0.30 to 1.20                           Unit: mg/L     Retinol Palmitate 07/25/2007 0.00   Final    Comment: Reference range: 0.00 to 0.10                           Unit: mg/L     Vitamin A Interp 07/25/2007 SEE NOTE   Final    Comment: (Note)                           Retinol greater than 0.20 mg/L is typically associated                           with adequate liver stores in adults, and is within                           normal limits for children. Retinol less than 0.10 mg/L                           may indicate depleted liver stores  and severe deficiency.                           Specimens which come in contact with plastic tubing or                           are exposed to excessive light may show low results.                           Performed by [facility],                           [address] [place] [address] [phone]                           [email or web address],  Peter Contreras MD - Lab. Director     Vitamin B12 07/25/2007 224  >210 pg/mL Final    Interp: 211-246 = Indeterminate     TSH 07/25/2007 2.17  0.4 - 5.0 mU/L Final     Parathyroid Hormone Intact 07/25/2007 48  12 - 72 pg/mL Final     Phosphorus 07/25/2007 4.7* 2.5 - 4.5 mg/dL Final     Magnesium 07/25/2007 1.7  1.6 - 2.3 mg/dL Final     MCV 07/25/2007 84  78 - 100 fl Final     MCH 07/25/2007 27.4  26.5 - 33.0 pg Final     MCHC 07/25/2007 32.8  31.5 - 36.5 g/dL Final     RDW 07/25/2007 15.9* 10.0 - 15.0 % Final     WBC 07/25/2007 9.3  4.0 - 11.0 10e9/L Final     RBC Count 07/25/2007 4.27  3.8 - 5.2 10e12/L Final     Hemoglobin 07/25/2007 11.7  11.7 - 15.7 g/dL Final     Hematocrit 07/25/2007 35.7  35.0 - 47.0 % Final     Homocysteine umol/L 07/25/2007 9.6  4.0 - 12.0 umol/L Final    Comment: A normal plasma homocysteine level rules out homocystinuria homozygote and                            defects involving the remethylation of homocysteine.  The heterozygous state                            for cystathionine betasynthase (CBS) is best tested by methionine loading.                            Lv Oconnor, Ph.D. [phone]     Folate 07/25/2007 11.4  >3.3 ng/mL Final    Interp:  >5.4 ng/mL = Normal     Ferritin 07/25/2007 5* 10 - 120 ng/mL Final     Copper 07/25/2007 83   Final    Comment: Reference range: 80 to 155                           Unit: ug/dL                           (Note)                           TEST INFORMATION: Copper, Serum                           Serum copper may be elevated with infection, inflammation,                           stress,  copper supplementation, oral contraceptives, and                           pregnancy. Concentrations are 2-3 times normalin the                           third trimester of pregnancy. Copper may be lowered with                           corticosteroids, zinc, malnutrition and malabsorption.                           Performed by [facility],                           [address] [place] [id number] [phone]                           [email or web address],  Peter Contreras MD - Lab. Director     Sodium 07/25/2007 142  133 - 144 mmol/L Final     Potassium 07/25/2007 4.4  3.4 - 5.3 mmol/L Final     Chloride 07/25/2007 105  94 - 109 mmol/L Final     Carbon Dioxide 07/25/2007 24  20 - 32 mmol/L Final     Glucose 07/25/2007 76  60 - 99 mg/dL Final     Urea Nitrogen 07/25/2007 13  5 - 24 mg/dL Final     Creatinine 07/25/2007 0.71  0.60 - 1.30 mg/dL Final     GFR Estimate 07/25/2007 >90  >60 mL/min/1.7m2 Final     GFR Estimate If Black 07/25/2007 >90  >60 mL/min/1.7m2 Final     Calcium 07/25/2007 8.7  8.5 - 10.4 mg/dL Final     AST 07/25/2007 25  0 - 45 U/L Final     Protein Total 07/25/2007 7.5  6.0 - 8.2 g/dL Final     Anion Gap 07/25/2007 13  6 - 17 mmol/L Final     Albumin 07/25/2007 4.5  3.3 - 4.6 g/dL Final     ALT 07/25/2007 17  0 - 50 U/L Final     Alkaline Phosphatase 07/25/2007 59  40 - 150 U/L Final     Bilirubin Total 07/25/2007 0.3  0.2 - 1.3 mg/dL Final     Carnitine Free 07/25/2007 45   Final    Comment: Reference range: 25 to 60                           Unit: umol/L     Carnitine Total 07/25/2007 55   Final    Comment: Reference range: 34 to 86                           Unit: umol/L     Carnitine Esterified 07/25/2007 10   Final    Comment: Reference range: 5 to 29                           Unit: umol/L     Cholesterol 07/25/2007 122  0 - 200 mg/dL Final    Comment: LDL Cholesterol is the primary guide to therapy: LDL-cholesterol goal in high                            risk patients is <100 mg/dL and  "in very high risk patients is <70 mg/dL.                            The NCEP recommends further evaluation of: patients with cholesterol <200                            mg/dL                            if additionalrisk factors are present, cholesterol >240 mg/dL, triglycerides                            >150 mg/dL, or HDL <40 mg/dL.     Triglycerides 07/25/2007 98  0 - 150 mg/dL Final     HDL Cholesterol 07/25/2007 48* 50 - 110 mg/dL Final     LDL Cholesterol Calculated 07/25/2007 55  0 - 129 mg/dL Final     VLDL-Cholesterol 07/25/2007 20  0 - 30 mg/dL Final     Cholesterol/HDL Ratio 07/25/2007 2.6  0.0 - 5.0 Final     Iron Saturation 07/25/2007 7* 20 - 55 % Final       PHYSICAL EXAM:  Objective    Ht 1.702 m (5' 7\")   Wt 103.4 kg (228 lb)   BMI 35.71 kg/m    Vitals - Patient Reported  Pain Score: No Pain (0)      Vitals:  No vitals were obtained today due to virtual visit.    Physical Exam   GENERAL: Healthy, alert and no distress  EYES: Eyes grossly normal to inspection.  No discharge or erythema, or obvious scleral/conjunctival abnormalities.  RESP: No audible wheeze, cough, or visible cyanosis.  No visible retractions or increased work of breathing.    SKIN: Visible skin clear. No significant rash, abnormal pigmentation or lesions.  NEURO: Cranial nerves grossly intact.  Mentation and speech appropriate for age.  PSYCH: Mentation appears normal, affect normal/bright, judgement and insight intact, normal speech and appearance well-groomed.      In summary, Saima Pearson has Class II obesity with a body mass index of Body mass index is 35.71 kg/m . kg/m2 and the comorbidities stated above. She has a history of D/S in 2003 and is here to re-establish bariatric care and discuss weight regain and epigastric pain.    MEDICATIONS:        - Start taking topiramate (taper up to 75mg), Phentermine 1/2 tab (18.75mg)      - Start Omeprazole, twice daily for ulcer         - B12 inj sent to pharmacy        - Continue " other medications without change  CONSULTATION/REFERRAL to Dietitian   FUTURE LABS:       - Schedule a fasting blood draw -bariatric labs   FUTURE APPOINTMENTS:       - Follow-up visit in 2 months         Sincerely,     Lori Harris NP       [facility]

## 2021-07-20 ENCOUNTER — TRANSFERRED RECORDS (OUTPATIENT)
Dept: HEALTH INFORMATION MANAGEMENT | Facility: CLINIC | Age: 41
End: 2021-07-20

## 2021-07-25 ENCOUNTER — HEALTH MAINTENANCE LETTER (OUTPATIENT)
Age: 41
End: 2021-07-25

## 2021-07-26 ENCOUNTER — TELEPHONE (OUTPATIENT)
Dept: ENDOCRINOLOGY | Facility: CLINIC | Age: 41
End: 2021-07-26

## 2021-07-26 NOTE — TELEPHONE ENCOUNTER
Receive out side records from [facility] sent to archive and Bariatric team and scan into pt chart

## 2021-09-08 ENCOUNTER — TELEPHONE (OUTPATIENT)
Dept: ENDOCRINOLOGY | Facility: CLINIC | Age: 41
End: 2021-09-08

## 2021-09-08 NOTE — TELEPHONE ENCOUNTER
Call to pharmacy after receiving paper refill request, message left of prescription sent and pharmacy confirmed 8/31/21 for 90 with 1 refill, no further authorization should be needed at this time.  Refill declined

## 2021-09-19 ENCOUNTER — HEALTH MAINTENANCE LETTER (OUTPATIENT)
Age: 41
End: 2021-09-19

## 2021-10-06 ENCOUNTER — TELEPHONE (OUTPATIENT)
Dept: ENDOCRINOLOGY | Facility: CLINIC | Age: 41
End: 2021-10-06

## 2021-10-06 NOTE — TELEPHONE ENCOUNTER
Could not reach pt, sent [employer]. 10/7/21 video appt cancelled due to provider unavailable. Pt can be rescheduled in a return 15 min slot on Lori's schedule, or can be manually scheduled at the end of Dr. Boland clinic schedule on a Monday.

## 2021-11-30 DIAGNOSIS — Z98.84 S/P BILIOPANCREATIC DIVERSION WITH DUODENAL SWITCH: ICD-10-CM

## 2021-12-01 NOTE — TELEPHONE ENCOUNTER
omeprazole (PRILOSEC) 20 MG DR guzmán  180 Tabs, 3 Refills sent to pharm   Last visit: 7/7/2021    Madeleine Jacome RN  Central Triage Red Flags/Med Refills

## 2022-08-21 ENCOUNTER — HEALTH MAINTENANCE LETTER (OUTPATIENT)
Age: 42
End: 2022-08-21

## 2022-10-07 DIAGNOSIS — Z98.84 S/P BILIOPANCREATIC DIVERSION WITH DUODENAL SWITCH: ICD-10-CM

## 2022-10-11 RX ORDER — CYANOCOBALAMIN 1000 UG/ML
1 INJECTION, SOLUTION INTRAMUSCULAR; SUBCUTANEOUS
Qty: 3 ML | Refills: 3 | OUTPATIENT
Start: 2022-10-11

## 2022-10-11 NOTE — TELEPHONE ENCOUNTER
cyanocobalamin (CYANOCOBALAMIN) 1000 MCG/ML injection  Last Written Prescription Date:   7/7/2021  Last Fill Quantity: 3,   # refills: 3  Last Office Visit :  7/7/2021  Future Office visit:  None    Routing refill request to provider for review/approval because:  Med Refused  Pt needs updated office visit  Scheduling notified       Madeleine Jacome RN  Central Triage Red Flags/Med Refills

## 2022-11-20 ENCOUNTER — HEALTH MAINTENANCE LETTER (OUTPATIENT)
Age: 42
End: 2022-11-20

## 2023-09-16 ENCOUNTER — HEALTH MAINTENANCE LETTER (OUTPATIENT)
Age: 43
End: 2023-09-16

## 2024-02-03 ENCOUNTER — HEALTH MAINTENANCE LETTER (OUTPATIENT)
Age: 44
End: 2024-02-03